# Patient Record
Sex: MALE | Race: WHITE | NOT HISPANIC OR LATINO | Employment: OTHER | URBAN - METROPOLITAN AREA
[De-identification: names, ages, dates, MRNs, and addresses within clinical notes are randomized per-mention and may not be internally consistent; named-entity substitution may affect disease eponyms.]

---

## 2017-05-05 ENCOUNTER — OFFICE VISIT (OUTPATIENT)
Dept: CARDIOLOGY | Facility: CLINIC | Age: 67
End: 2017-05-05

## 2017-05-05 VITALS
SYSTOLIC BLOOD PRESSURE: 128 MMHG | WEIGHT: 223.2 LBS | DIASTOLIC BLOOD PRESSURE: 78 MMHG | HEART RATE: 73 BPM | BODY MASS INDEX: 28.64 KG/M2 | HEIGHT: 74 IN

## 2017-05-05 DIAGNOSIS — K80.20 ASYMPTOMATIC CHOLELITHIASIS: ICD-10-CM

## 2017-05-05 DIAGNOSIS — E78.5 DYSLIPIDEMIA: ICD-10-CM

## 2017-05-05 DIAGNOSIS — E66.3 OVERWEIGHT (BMI 25.0-29.9): ICD-10-CM

## 2017-05-05 DIAGNOSIS — I50.42 CHRONIC COMBINED SYSTOLIC AND DIASTOLIC HEART FAILURE (HCC): ICD-10-CM

## 2017-05-05 DIAGNOSIS — R07.9 CHEST PAIN, UNSPECIFIED TYPE: Primary | ICD-10-CM

## 2017-05-05 DIAGNOSIS — I50.42 CHRONIC COMBINED SYSTOLIC AND DIASTOLIC CONGESTIVE HEART FAILURE (HCC): ICD-10-CM

## 2017-05-05 DIAGNOSIS — T73.3XXD FATIGUE DUE TO EXCESSIVE EXERTION, SUBSEQUENT ENCOUNTER: ICD-10-CM

## 2017-05-05 PROCEDURE — 99213 OFFICE O/P EST LOW 20 MIN: CPT | Performed by: INTERNAL MEDICINE

## 2017-05-05 RX ORDER — CARVEDILOL 12.5 MG/1
12.5 TABLET ORAL 2 TIMES DAILY WITH MEALS
COMMUNITY

## 2017-05-05 RX ORDER — MONTELUKAST SODIUM 10 MG/1
10 TABLET ORAL NIGHTLY
COMMUNITY

## 2017-05-05 RX ORDER — POTASSIUM CHLORIDE 20 MEQ/1
20 TABLET, EXTENDED RELEASE ORAL DAILY
COMMUNITY

## 2017-05-24 ENCOUNTER — HOSPITAL ENCOUNTER (OUTPATIENT)
Dept: CARDIOLOGY | Facility: HOSPITAL | Age: 67
Discharge: HOME | End: 2017-05-24
Attending: INTERNAL MEDICINE

## 2017-05-24 ENCOUNTER — HOSPITAL ENCOUNTER (OUTPATIENT)
Dept: CARDIOLOGY | Facility: HOSPITAL | Age: 67
Discharge: HOME | End: 2017-05-24
Attending: INTERNAL MEDICINE | Admitting: INTERNAL MEDICINE

## 2017-05-24 VITALS — HEIGHT: 74 IN | WEIGHT: 218 LBS | BODY MASS INDEX: 27.98 KG/M2

## 2017-05-24 DIAGNOSIS — R07.9 CHEST PAIN, UNSPECIFIED TYPE: ICD-10-CM

## 2017-05-24 DIAGNOSIS — I50.42 CHRONIC COMBINED SYSTOLIC AND DIASTOLIC HEART FAILURE (HCC): ICD-10-CM

## 2017-05-24 LAB
BH CV STRESS BP STAGE 1: NORMAL
BH CV STRESS BP STAGE 2: NORMAL
BH CV STRESS BP STAGE 3: NORMAL
BH CV STRESS DURATION MIN STAGE 1: 3
BH CV STRESS DURATION MIN STAGE 2: 3
BH CV STRESS DURATION MIN STAGE 3: 1
BH CV STRESS DURATION SEC STAGE 1: 0
BH CV STRESS DURATION SEC STAGE 2: 0
BH CV STRESS DURATION SEC STAGE 3: 41
BH CV STRESS GRADE STAGE 1: 10
BH CV STRESS GRADE STAGE 2: 12
BH CV STRESS GRADE STAGE 3: 14
BH CV STRESS HR STAGE 1: 103
BH CV STRESS HR STAGE 2: 121
BH CV STRESS HR STAGE 3: 134
BH CV STRESS METS STAGE 1: 5
BH CV STRESS METS STAGE 2: 7.5
BH CV STRESS METS STAGE 3: 10
BH CV STRESS O2 STAGE 1: 97
BH CV STRESS O2 STAGE 2: 97
BH CV STRESS O2 STAGE 3: 96
BH CV STRESS PROTOCOL 1: NORMAL
BH CV STRESS RECOVERY BP: NORMAL MMHG
BH CV STRESS RECOVERY HR: 87 BPM
BH CV STRESS SPEED STAGE 1: 1.7
BH CV STRESS SPEED STAGE 2: 2.5
BH CV STRESS SPEED STAGE 3: 3.4
BH CV STRESS STAGE 1: 1
BH CV STRESS STAGE 2: 2
BH CV STRESS STAGE 3: 3
MAXIMAL PREDICTED HEART RATE: 153 BPM
PERCENT MAX PREDICTED HR: 87.58 %
STRESS BASELINE BP: NORMAL MMHG
STRESS BASELINE HR: 70 BPM
STRESS PERCENT HR: 103 %
STRESS POST ESTIMATED WORKLOAD: 8.2 METS
STRESS POST EXERCISE DUR MIN: 7 MIN
STRESS POST EXERCISE DUR SEC: 41 SEC
STRESS POST PEAK BP: NORMAL MMHG
STRESS POST PEAK HR: 134 BPM
STRESS TARGET HR: 130 BPM

## 2017-05-24 PROCEDURE — A9500 TC99M SESTAMIBI: HCPCS | Performed by: INTERNAL MEDICINE

## 2017-05-24 PROCEDURE — 93018 CV STRESS TEST I&R ONLY: CPT | Performed by: INTERNAL MEDICINE

## 2017-05-24 PROCEDURE — 78452 HT MUSCLE IMAGE SPECT MULT: CPT | Performed by: INTERNAL MEDICINE

## 2017-05-24 PROCEDURE — 93017 CV STRESS TEST TRACING ONLY: CPT

## 2017-05-24 PROCEDURE — 78452 HT MUSCLE IMAGE SPECT MULT: CPT

## 2017-05-24 PROCEDURE — 0 TECHNETIUM SESTAMIBI: Performed by: INTERNAL MEDICINE

## 2017-05-24 RX ADMIN — Medication 1 DOSE: at 11:30

## 2017-05-24 RX ADMIN — Medication 1 DOSE: at 13:35

## 2017-11-15 ENCOUNTER — OFFICE VISIT (OUTPATIENT)
Dept: CARDIOLOGY | Facility: CLINIC | Age: 67
End: 2017-11-15

## 2017-11-15 VITALS
HEART RATE: 79 BPM | DIASTOLIC BLOOD PRESSURE: 77 MMHG | HEIGHT: 74 IN | BODY MASS INDEX: 28.42 KG/M2 | WEIGHT: 221.4 LBS | SYSTOLIC BLOOD PRESSURE: 117 MMHG

## 2017-11-15 DIAGNOSIS — K80.20 ASYMPTOMATIC CHOLELITHIASIS: ICD-10-CM

## 2017-11-15 DIAGNOSIS — I42.0 DILATED CARDIOMYOPATHY (HCC): ICD-10-CM

## 2017-11-15 DIAGNOSIS — E78.5 DYSLIPIDEMIA: ICD-10-CM

## 2017-11-15 DIAGNOSIS — I50.42 CHRONIC COMBINED SYSTOLIC AND DIASTOLIC CONGESTIVE HEART FAILURE (HCC): Primary | ICD-10-CM

## 2017-11-15 PROCEDURE — 99214 OFFICE O/P EST MOD 30 MIN: CPT | Performed by: INTERNAL MEDICINE

## 2017-11-15 PROCEDURE — 90686 IIV4 VACC NO PRSV 0.5 ML IM: CPT | Performed by: INTERNAL MEDICINE

## 2017-11-15 PROCEDURE — 90471 IMMUNIZATION ADMIN: CPT | Performed by: INTERNAL MEDICINE

## 2017-11-15 RX ORDER — BACLOFEN 10 MG/1
10 TABLET ORAL AS NEEDED
COMMUNITY
End: 2019-07-26

## 2017-11-15 RX ORDER — CETIRIZINE HYDROCHLORIDE 10 MG/1
10 TABLET ORAL DAILY
COMMUNITY

## 2017-11-15 NOTE — PROGRESS NOTES
Subjective:     Encounter Date:11/15/2017    Patient ID: Howard Luis is a 67 y.o.  white male, retired [employer] , from [place], [place].      PHYSICIAN: Jessica Starkey MD  OTOLARYNGOLOGIST: Dr. Dela Cruz, [place], [place]  PRIOR CARDIOLOGIST: Ele Patterson MD    Chief Complaint:   Chief Complaint   Patient presents with   • Chest Pain     Problem List:  1. Nonischemic dilated congestive cardiomyopathy:  a. Remote CCS class I angina pectoris/NYHA class II-III exertional dyspnea and fatigue syndrome with abnormal EKG/abnormal echocardiogram with diastolic LV dysfunction and bi-atrial enlargement, RV enlargement without valvulopathy with mild MR and trace TR with acceptable LVEF (0.50) with subsequently abnormal Cardiolite GXT demonstrating dilated left ventricle with diffuse hypokinesis and reduced LVEF (0.40) with moderate inferolateral ischemia with good exercise tolerance and capacity, autumn 2011.  b. Normal dominant right coronary arteries with reduced compensated systolic LV function (LVEF 0.42), February 2012.  c. Residual CCS class I angina pectoris/NYHA class I-II exertional dyspnea and fatigue syndrome with stable abnormal echocardiograms, November 2012; July 2013; September 2014; June 2015.   d. Remote CCS class II chest pain syndrome with NYHA class II-III exertional dyspnea and fatigue syndrome with acceptable echocardiogram and acceptable laboratory studies including normal D-dimer and BNP as well as low probability of pulmonary ventilation-perfusion scan, February 2016.  e. Remote CCS class I angina pectoris/NYHA class II exertional dyspnea and fatigue with preliminary acceptable echocardiogram and within normal limits EKG (November 2016).  f. Recent CCS class II-III chest pain syndrome with NYHA class II-III exertional dyspnea and fatigue with limited activity, May 2017, and subsequent acceptable negative Cardiolite GXT suggestive of low probability for CAD following  exercise to 100% predicted exercise capacity with inability to gait rhythm to perform LVEF assessment.  g. Residual class I symptoms.  2. Dyslipidemia.   3. Mildly overweight (BMI 28.4).   4. Erectile dysfunction.   5. Intermittent recurrent sinusitis, winter 2012.   6. Remote operations:  a. Upper and lower extensive dental implants, 1992 and 2004.   b. Left forearm basal cell carcinoma removal, February 2011.   c. Complex nasal/sinus surgery - data deficit, [place], January 2012.   7. Asymptomatic cholelithiasis.   8. Remote noncompliance.  9. Fatigue syndrome with a history of normal formal sleep evaluation 9 years ago, 2006.     No Known Allergies      Current Outpatient Prescriptions:   •  aspirin 81 MG tablet, Take  by mouth daily., Disp: , Rfl:   •  baclofen (LIORESAL) 10 MG tablet, Take 10 mg by mouth As Needed for Muscle Spasms., Disp: , Rfl:   •  carvedilol (COREG) 12.5 MG tablet, Take 12.5 mg by mouth 2 (Two) Times a Day With Meals., Disp: , Rfl:   •  cetirizine (zyrTEC) 10 MG tablet, Take 10 mg by mouth Daily., Disp: , Rfl:   •  Cholecalciferol (VITAMIN D3) 68676 UNITS capsule, Take 5,000 Units by mouth Daily., Disp: , Rfl:   •  FLUoxetine (PROzac) 40 MG capsule, Take  by mouth 2 (two) times a day., Disp: , Rfl:   •  fluticasone (FLONASE) 50 MCG/ACT nasal spray, 1 spray into each nostril as needed., Disp: , Rfl:   •  furosemide (LASIX) 40 MG tablet, Take 40 mg by mouth daily., Disp: , Rfl:   •  ibuprofen (ADVIL,MOTRIN) 600 MG tablet, Take  by mouth as needed., Disp: , Rfl:   •  meloxicam (MOBIC) 15 MG tablet, Take 15 mg by mouth As Needed., Disp: , Rfl:   •  montelukast (SINGULAIR) 10 MG tablet, Take 10 mg by mouth Every Night., Disp: , Rfl:   •  pantoprazole (PROTONIX) 40 MG EC tablet, Take  by mouth daily., Disp: , Rfl:   •  potassium chloride (K-DUR,KLOR-CON) 20 MEQ CR tablet, Take 20 mEq by mouth Daily., Disp: , Rfl:   •  ramipril (ALTACE) 10 MG capsule, Take  by mouth daily., Disp: , Rfl:   •   "sildenafil (VIAGRA) 100 MG tablet, Take  by mouth as needed., Disp: , Rfl:   •  Testosterone Cypionate (DEPO-TESTOSTERONE) 200 MG/ML injection, Apply  to cheek every 14 (fourteen) days., Disp: , Rfl:   •  Unable to find, Every Night. Med Name: Uses oxygen at night., Disp: , Rfl:   •  zolpidem (AMBIEN) 10 MG tablet, Take  by mouth as needed., Disp: , Rfl:     HISTORY OF PRESENT ILLNESS: Patient returns for scheduled followup after a 6-month hiatus. He says that he felt \"pretty bad\" for most of the summer and all he wanted to do was sit, but now that the weather is changing, he has done better and has more energy.  He and his wife travel when they can, but the last trip they took was the [place] cruise in [place].  Heart-wise, he is doing well now, and he has been walking and exercising more.  He says that his \"honey-do list has gotten long.\"  He has no symptoms from a cardiopulmonary perspective with his daily activities.  He has not gotten his flu shot yet, but \"it's on the list.\"  They are staying home for Thanksgiving; he notes that he and his wife have both had a bad cough.  He has had no fever with the cough.  Patient otherwise denies chest pain, shortness of breath, PND, edema, palpitations, syncope or presyncope at this time.        ROS   Obtained and otherwise negative except as outlined in problem list and HPI.    Procedures       Objective:       Vitals:    11/15/17 1420 11/15/17 1421   BP: 116/83 117/77   BP Location: Left arm Left arm   Patient Position: Sitting Standing   Pulse: 70 79   Weight: 221 lb 6.4 oz (100 kg)    Height: 74\" (188 cm)      Body mass index is 28.43 kg/(m^2).   Last weight:  223 lbs.    Physical Exam   Constitutional: He is oriented to person, place, and time. He appears well-developed and well-nourished.   Neck: No JVD present. Carotid bruit is not present. No thyromegaly present.   Cardiovascular: Regular rhythm, S1 normal, S2 normal and normal heart sounds.  Exam reveals no " gallop, no S3 and no friction rub.    No murmur heard.  Pulses:       Dorsalis pedis pulses are 2+ on the right side, and 2+ on the left side.        Posterior tibial pulses are 2+ on the right side, and 2+ on the left side.   Pulmonary/Chest: Effort normal and breath sounds normal. He has no wheezes. He has no rhonchi. He has no rales.   Abdominal: Soft. He exhibits no mass. There is no hepatosplenomegaly. There is no tenderness. There is no guarding.   Bowel sounds audible x4   Musculoskeletal: Normal range of motion. He exhibits no edema.   Lymphadenopathy:     He has no cervical adenopathy.   Neurological: He is alert and oriented to person, place, and time.   Skin: Skin is warm, dry and intact. No rash noted.   Vitals reviewed.        Lab Review:   Lab Results   Component Value Date    GLUCOSE 89 01/29/2016    BUN 17 01/29/2016    CREATININE 1.25 01/29/2016    BCR 13.5 01/29/2016    CO2 31.7 01/29/2016    CALCIUM 9.8 01/29/2016       Lab Results   Component Value Date    WBC 5.9 01/29/2016    HGB 18.0 01/29/2016    HCT 53.1 (H) 01/29/2016    MCV 90.8 01/29/2016     01/29/2016       Lab Results   Component Value Date    BNP 25 01/29/2016           Assessment:   Overall continued acceptable course with no interim cardiopulmonary complaints with good functional status. We will defer additional diagnostic or therapeutic intervention from a cardiac perspective at this time.  The patient requests that he have a flu shot today.       Diagnosis Plan   1. Chronic combined systolic and diastolic congestive heart failure  No recurrent angina pectoris or CHF.     2. Asymptomatic cholelithiasis  Asymptomatic   3. Dyslipidemia  No recent data to review   4. Dilated cardiomyopathy  Acceptable control of symptoms at this time          Plan:         1. Patient to continue current medications and close follow up with the above providers.  2. Influenza immunization is administered today without difficulty or complication  in the left deltoid.  3. Tentative cardiology follow up in May 2018, or patient may return sooner PRN with consideration of echocardiogram 6-12 months thereafter.       Transcribed by Karmen Rosales for Dr. Walt Paula at 2:29 PM on 11/15/2017      IWalt MD, [facility], personally performed the services described in this documentation as scribed by the above named individual in my presence, and it is both accurate and complete. At 3:31 PM on 11/15/2017

## 2018-05-25 ENCOUNTER — OFFICE VISIT (OUTPATIENT)
Dept: CARDIOLOGY | Facility: CLINIC | Age: 68
End: 2018-05-25

## 2018-05-25 VITALS
WEIGHT: 222 LBS | HEIGHT: 74 IN | HEART RATE: 74 BPM | BODY MASS INDEX: 28.49 KG/M2 | SYSTOLIC BLOOD PRESSURE: 86 MMHG | DIASTOLIC BLOOD PRESSURE: 60 MMHG

## 2018-05-25 DIAGNOSIS — I42.0 DILATED CARDIOMYOPATHY (HCC): Primary | ICD-10-CM

## 2018-05-25 DIAGNOSIS — Z12.5 ENCOUNTER FOR SCREENING FOR MALIGNANT NEOPLASM OF PROSTATE: ICD-10-CM

## 2018-05-25 DIAGNOSIS — N52.9 ERECTILE DYSFUNCTION, UNSPECIFIED ERECTILE DYSFUNCTION TYPE: ICD-10-CM

## 2018-05-25 DIAGNOSIS — E78.5 DYSLIPIDEMIA: ICD-10-CM

## 2018-05-25 DIAGNOSIS — I50.42 CHRONIC COMBINED SYSTOLIC AND DIASTOLIC CONGESTIVE HEART FAILURE (HCC): ICD-10-CM

## 2018-05-25 PROCEDURE — 99214 OFFICE O/P EST MOD 30 MIN: CPT | Performed by: INTERNAL MEDICINE

## 2018-05-25 NOTE — PROGRESS NOTES
Subjective:     Encounter Date:05/25/2018    Patient ID: Howard Luis is a 68 y.o.  white male, retired [employer] , from [place], [place].      PHYSICIAN: Jessica Starkey MD  OTOLARYNGOLOGIST: Dr. Dela Cruz, [place], [place]  PRIOR CARDIOLOGIST: Ele Patterson MD    Chief Complaint:   Chief Complaint   Patient presents with   • Congestive Heart Failure   • Shortness of Breath     Problem List:  1. Nonischemic dilated congestive cardiomyopathy:  a. Remote CCS class I angina pectoris/NYHA class II-III exertional dyspnea and fatigue syndrome with abnormal EKG/abnormal echocardiogram with diastolic LV dysfunction and bi-atrial enlargement, RV enlargement without valvulopathy with mild MR and trace TR with acceptable LVEF (0.50) with subsequently abnormal Cardiolite GXT demonstrating dilated left ventricle with diffuse hypokinesis and reduced LVEF (0.40) with moderate inferolateral ischemia with good exercise tolerance and capacity, autumn 2011.  b. Normal dominant right coronary arteries with reduced compensated systolic LV function (LVEF 0.42), February 2012.  c. Residual CCS class I angina pectoris/NYHA class I-II exertional dyspnea and fatigue syndrome with stable abnormal echocardiograms, November 2012; July 2013; September 2014; June 2015.   d. Remote CCS class II chest pain syndrome with NYHA class II-III exertional dyspnea and fatigue syndrome with acceptable echocardiogram and acceptable laboratory studies including normal D-dimer and BNP as well as low probability of pulmonary ventilation-perfusion scan, February 2016.  e. Remote CCS class I angina pectoris/NYHA class II exertional dyspnea and fatigue with preliminary acceptable echocardiogram and within normal limits EKG (November 2016).  f. Recent CCS class II-III chest pain syndrome with NYHA class II-III exertional dyspnea and fatigue with limited activity, May 2017, and subsequent acceptable negative Cardiolite GXT suggestive  of low probability for CAD following exercise to 100% predicted exercise capacity with inability to gait rhythm to perform LVEF assessment.  g. Residual class I symptoms.  2. Dyslipidemia.   3. Mildly overweight (BMI 28.5).   4. Erectile dysfunction.   5. Intermittent recurrent sinusitis, winter 2012.   6. Remote operations:  a. Upper and lower extensive dental implants, 1992 and 2004.   b. Left forearm basal cell carcinoma removal, February 2011.   c. Complex nasal/sinus surgery - data deficit, [place], January 2012.   7. Asymptomatic cholelithiasis.   8. Remote noncompliance.  9. Fatigue syndrome with a history of normal formal sleep evaluation 9 years ago, 2006.     No Known Allergies      Current Outpatient Prescriptions:   •  aspirin 81 MG tablet, Take  by mouth daily., Disp: , Rfl:   •  baclofen (LIORESAL) 10 MG tablet, Take 10 mg by mouth As Needed for Muscle Spasms., Disp: , Rfl:   •  carvedilol (COREG) 12.5 MG tablet, Take 12.5 mg by mouth 2 (Two) Times a Day With Meals., Disp: , Rfl:   •  cetirizine (zyrTEC) 10 MG tablet, Take 10 mg by mouth Daily., Disp: , Rfl:   •  Cholecalciferol (VITAMIN D3) 25549 UNITS capsule, Take 5,000 Units by mouth Daily., Disp: , Rfl:   •  FLUoxetine (PROzac) 40 MG capsule, Take  by mouth 2 (two) times a day., Disp: , Rfl:   •  fluticasone (FLONASE) 50 MCG/ACT nasal spray, 1 spray into each nostril as needed., Disp: , Rfl:   •  furosemide (LASIX) 40 MG tablet, Take 40 mg by mouth daily., Disp: , Rfl:   •  ibuprofen (ADVIL,MOTRIN) 600 MG tablet, Take  by mouth as needed., Disp: , Rfl:   •  meloxicam (MOBIC) 15 MG tablet, Take 15 mg by mouth As Needed., Disp: , Rfl:   •  montelukast (SINGULAIR) 10 MG tablet, Take 10 mg by mouth Every Night., Disp: , Rfl:   •  potassium chloride (K-DUR,KLOR-CON) 20 MEQ CR tablet, Take 20 mEq by mouth Daily., Disp: , Rfl:   •  ramipril (ALTACE) 10 MG capsule, Take  by mouth daily., Disp: , Rfl:   •  sildenafil (VIAGRA) 100 MG tablet, Take  by  "mouth as needed., Disp: , Rfl:   •  Testosterone Cypionate (DEPO-TESTOSTERONE) 200 MG/ML injection, Apply  to cheek every 14 (fourteen) days., Disp: , Rfl:   •  Unable to find, Every Night. Med Name: Uses oxygen at night., Disp: , Rfl:   •  zolpidem (AMBIEN) 10 MG tablet, Take  by mouth as needed., Disp: , Rfl:     HISTORY OF PRESENT ILLNESS: Patient returns for scheduled 6-month followup. He denies any chest pressure but has intermittent shortness of breath on exertion.  He attributes this to his cardiomyopathy.  He had a stress test in May 2017 which was acceptable.  Occasionally, he has lower extremity edema as well.  He denies any fevers, sputum production, chest pressure, palpitations, or presyncope.  He is fatigued.  He checks his blood pressure at home, and it is normally around 120/70.  He has not had any recent laboratory testing. He does not feel that his SOB is worse than 6 months ago.  Patient otherwise denies chest pain, shortness of breath, PND, edema, palpitations, syncope or presyncope at this time on limited activity.  He drove across country to [facility] to accompany a friend who was on a medical visit to [facility] [place] for gastric sleeve surgery.  He will journey to [place] for a river cruise in early July 2018.       Review of Systems   Constitution: Positive for weakness.   HENT: Positive for tinnitus.    Respiratory: Positive for shortness of breath.       Obtained and otherwise negative except as outlined in problem list and HPI.    Procedures       Objective:       Vitals:    05/25/18 1345 05/25/18 1347   BP: 108/71 (!) 86/60   BP Location: Left arm Left arm   Patient Position: Sitting Standing   Pulse: 65 74   Weight: 101 kg (222 lb) 101 kg (222 lb)   Height: 188 cm (74\") 188 cm (74\")   Recheck blood pressure right arm sitting is 112/76, 120/80 right arm standing  Body mass index is 28.5 kg/m².   Last weight:  221 lbs.    Physical Exam   Constitutional: He is oriented to person, place, " and time. He appears well-developed and well-nourished.   Neck: No JVD present. Carotid bruit is not present. No thyromegaly present.   Cardiovascular: Regular rhythm, S1 normal, S2 normal and normal heart sounds.  Exam reveals no gallop, no S3 and no friction rub.    No murmur heard.  Pulses:       Dorsalis pedis pulses are 1+ on the right side, and 1+ on the left side.        Posterior tibial pulses are 1+ on the right side, and 1+ on the left side.   Pulmonary/Chest: Effort normal. He has decreased breath sounds. He has no wheezes. He has no rhonchi. He has no rales.   Abdominal: Soft. He exhibits no mass. There is no hepatosplenomegaly. There is no tenderness. There is no guarding.   Bowel sounds audible x4   Musculoskeletal: Normal range of motion. He exhibits no edema.   Lymphadenopathy:     He has no cervical adenopathy.   Neurological: He is alert and oriented to person, place, and time.   Skin: Skin is warm, dry and intact. No rash noted.   Vitals reviewed.        Lab Review:   Lab Results   Component Value Date    GLUCOSE 89 01/29/2016    BUN 17 01/29/2016    CREATININE 1.25 01/29/2016    BCR 13.5 01/29/2016    CO2 31.7 01/29/2016    CALCIUM 9.8 01/29/2016       Lab Results   Component Value Date    WBC 5.9 01/29/2016    HGB 18.0 01/29/2016    HCT 53.1 (H) 01/29/2016    MCV 90.8 01/29/2016     01/29/2016       Lab Results   Component Value Date    BNP 25 01/29/2016           Assessment:   Overall continued acceptable course with no interim cardiopulmonary complaints with acceptable functional status. We will provide him with laboratory slips to have the following tests: CMP, CBC, FLP, PSA.       Diagnosis Plan   1. Dilated cardiomyopathy  CBC & Differential    Comprehensive Metabolic Panel    PSA Screen   2. Chronic combined systolic and diastolic congestive heart failure  CBC & Differential    Comprehensive Metabolic Panel    PSA Screen   3. Dyslipidemia  CBC & Differential    Lipid Panel   4.  Erectile dysfunction, unspecified erectile dysfunction type  PSA Screen   5. Encounter for screening for malignant neoplasm of prostate   PSA Screen          Plan:         1. Patient to continue current medications and close follow up with the above providers.  2. Tentative cardiology follow up in November 2018 or patient may return sooner PRN.  3. CBC, CMP, FLP, PSA    Scribed for Walt Paula MD by Griselda Ramirez, APRN. 5/25/2018  2:31 PM    I, Walt Paula MD, FACC, personally performed the services described in this documentation as scribed by the above named individual in my presence, and it is both accurate and complete. At 3:58 PM on 05/25/2018

## 2018-08-20 ENCOUNTER — HOSPITAL ENCOUNTER (OUTPATIENT)
Dept: RESPIRATORY THERAPY | Facility: HOSPITAL | Age: 68
Discharge: HOME | End: 2018-08-20
Attending: INTERNAL MEDICINE | Admitting: INTERNAL MEDICINE

## 2018-08-20 ENCOUNTER — TRANSCRIBE ORDERS (OUTPATIENT)
Dept: ADMINISTRATIVE | Facility: HOSPITAL | Age: 68
End: 2018-08-20

## 2018-08-20 DIAGNOSIS — R00.2 PALPITATIONS: ICD-10-CM

## 2018-08-20 DIAGNOSIS — R00.2 PALPITATIONS: Primary | ICD-10-CM

## 2018-08-20 PROCEDURE — 93225 XTRNL ECG REC<48 HRS REC: CPT

## 2018-08-20 PROCEDURE — 93226 XTRNL ECG REC<48 HR SCAN A/R: CPT

## 2018-08-24 ENCOUNTER — TELEPHONE (OUTPATIENT)
Dept: CARDIOLOGY | Facility: CLINIC | Age: 68
End: 2018-08-24

## 2018-08-24 NOTE — TELEPHONE ENCOUNTER
Patient called and stated that his family MD was faxing records from a [facility].  He stated that they wanted him to see a cardiologist but he told them he was seeing [facility].    Attached records.

## 2018-08-31 ENCOUNTER — TELEPHONE (OUTPATIENT)
Dept: CARDIOLOGY | Facility: CLINIC | Age: 68
End: 2018-08-31

## 2019-01-16 ENCOUNTER — OFFICE VISIT (OUTPATIENT)
Dept: CARDIOLOGY | Facility: CLINIC | Age: 69
End: 2019-01-16

## 2019-01-16 VITALS
HEART RATE: 69 BPM | DIASTOLIC BLOOD PRESSURE: 72 MMHG | BODY MASS INDEX: 28.62 KG/M2 | WEIGHT: 223 LBS | SYSTOLIC BLOOD PRESSURE: 103 MMHG | HEIGHT: 74 IN

## 2019-01-16 DIAGNOSIS — E78.5 DYSLIPIDEMIA: ICD-10-CM

## 2019-01-16 DIAGNOSIS — I50.42 CHRONIC COMBINED SYSTOLIC AND DIASTOLIC CONGESTIVE HEART FAILURE (HCC): Primary | ICD-10-CM

## 2019-01-16 DIAGNOSIS — I42.0 DILATED CARDIOMYOPATHY (HCC): ICD-10-CM

## 2019-01-16 PROCEDURE — 99214 OFFICE O/P EST MOD 30 MIN: CPT | Performed by: INTERNAL MEDICINE

## 2019-01-16 NOTE — PROGRESS NOTES
Subjective:     Encounter Date:01/16/2019    Patient ID: Howard Luis is a 68 y.o.  white male, retired [employer] , from [place], [place].      PHYSICIAN: Jessica Starkey MD  OTOLARYNGOLOGIST: Dr. Dela Cruz, [place], [place]  PRIOR CARDIOLOGIST: Ele Patterson MD    Chief Complaint:   Chief Complaint   Patient presents with   • Cardiomyopathy   • Congestive Heart Failure     Problem List:  1. Nonischemic dilated congestive cardiomyopathy:  a. Remote CCS class I angina pectoris/NYHA class II-III exertional dyspnea and fatigue syndrome with abnormal EKG/abnormal echocardiogram with diastolic LV dysfunction and bi-atrial enlargement, RV enlargement without valvulopathy with mild MR and trace TR with acceptable LVEF (0.50) with subsequently abnormal Cardiolite GXT demonstrating dilated left ventricle with diffuse hypokinesis and reduced LVEF (0.40) with moderate inferolateral ischemia with good exercise tolerance and capacity, autumn 2011.  b. Normal dominant right coronary arteries with reduced compensated systolic LV function (LVEF 0.42), February 2012.  c. Residual CCS class I angina pectoris/NYHA class I-II exertional dyspnea and fatigue syndrome with stable abnormal echocardiograms, November 2012; July 2013; September 2014; June 2015.   d. Remote CCS class II chest pain syndrome with NYHA class II-III exertional dyspnea and fatigue syndrome with acceptable echocardiogram and acceptable laboratory studies including normal D-dimer and BNP as well as low probability of pulmonary ventilation-perfusion scan, February 2016.  e. Remote CCS class I angina pectoris/NYHA class II exertional dyspnea and fatigue with preliminary acceptable echocardiogram and within normal limits EKG (November 2016).  f. Remote CCS class II-III chest pain syndrome with NYHA class II-III exertional dyspnea and fatigue with limited activity, May 2017, and subsequent acceptable negative Cardiolite GXT suggestive of low  probability for CAD following exercise to 100% predicted exercise capacity with inability to gait rhythm to perform LVEF assessment.  g. Residual class I symptoms.  2. Dyslipidemia.   3. Mildly overweight (BMI 28.6).   4. Erectile dysfunction.   5. Intermittent recurrent sinusitis, winter 2012.   6. Remote operations:  a. Upper and lower extensive dental implants, 1992 and 2004.   b. Left forearm basal cell carcinoma removal, February 2011.   c. Complex nasal/sinus surgery - data deficit, [place], January 2012.   7. Asymptomatic cholelithiasis.   8. Remote noncompliance.  9. Fatigue syndrome with a history of normal formal sleep evaluation 9 years ago, 2006.  10. PVCs with 24-hour Holter monitor demonstrating 2.2% VE's and no evidence of atrial fibrillation or heart block      No Known Allergies      Current Outpatient Medications:   •  aspirin 81 MG tablet, Take  by mouth daily., Disp: , Rfl:   •  baclofen (LIORESAL) 10 MG tablet, Take 10 mg by mouth As Needed for Muscle Spasms., Disp: , Rfl:   •  carvedilol (COREG) 12.5 MG tablet, Take 12.5 mg by mouth 2 (Two) Times a Day With Meals., Disp: , Rfl:   •  cetirizine (zyrTEC) 10 MG tablet, Take 10 mg by mouth Daily., Disp: , Rfl:   •  Cholecalciferol (VITAMIN D3) 59323 UNITS capsule, Take 5,000 Units by mouth As Needed., Disp: , Rfl:   •  FLUoxetine (PROzac) 40 MG capsule, Take  by mouth 2 (two) times a day., Disp: , Rfl:   •  fluticasone (FLONASE) 50 MCG/ACT nasal spray, 1 spray into each nostril as needed., Disp: , Rfl:   •  furosemide (LASIX) 40 MG tablet, Take 40 mg by mouth daily., Disp: , Rfl:   •  ibuprofen (ADVIL,MOTRIN) 600 MG tablet, Take  by mouth as needed., Disp: , Rfl:   •  meloxicam (MOBIC) 15 MG tablet, Take 15 mg by mouth As Needed., Disp: , Rfl:   •  montelukast (SINGULAIR) 10 MG tablet, Take 10 mg by mouth Every Night., Disp: , Rfl:   •  potassium chloride (K-DUR,KLOR-CON) 20 MEQ CR tablet, Take 20 mEq by mouth Daily., Disp: , Rfl:   •  ramipril  "(ALTACE) 10 MG capsule, Take  by mouth daily., Disp: , Rfl:   •  sildenafil (VIAGRA) 100 MG tablet, Take  by mouth as needed., Disp: , Rfl:   •  Testosterone Cypionate (DEPO-TESTOSTERONE) 200 MG/ML injection, Apply  to cheek every 14 (fourteen) days., Disp: , Rfl:   •  Unable to find, Every Night. Med Name: Uses oxygen at night., Disp: , Rfl:   •  zolpidem (AMBIEN) 10 MG tablet, Take  by mouth as needed., Disp: , Rfl:     HISTORY OF PRESENT ILLNESS: Patient returns for followup after an 8-month hiatus.  He occasionally has mild chest discomfort and SOB, but he has never felt the need to take nitroglycerin.  This happens very sporadically and infrequently.  He had an episode a few months ago when he was having PVCs and had the skipped beats for 1.5 weeks, and then they spontaneously resolved.  He wore a 24-hour Holter monitor which demonstrated PVCs.  His main concern today is his lack of sex drive.  He describes it as \"going to a buffet but you aren't hungry.\" He is wondering if some of his medications are causing his symptoms.  He was informed that this may be due to his Prozac. He is taking testosterone every other week, but he states that his physician always worries about PSA levels.  He has not had any recent laboratory testing but will send us the next results when he has his blood drawn.  He denies any dizziness, presyncope, syncope, or edema.  Patient otherwise denies chest pain, shortness of breath, PND, edema, recurrent palpitations, syncope or presyncope at this time.        Review of Systems   HENT: Positive for hearing loss.    Respiratory: Positive for shortness of breath.    Endocrine: Positive for cold intolerance.      Obtained and otherwise negative except as outlined in problem list and HPI.    Procedures       Objective:       Vitals:    01/16/19 1410 01/16/19 1411   BP: 133/79 103/72   BP Location: Left arm Left arm   Patient Position: Sitting Standing   Pulse: 64 69   Weight: 101 kg (223 lb)  " "  Height: 188 cm (74\")      Body mass index is 28.63 kg/m².   Last weight:  222 lbs.    Physical Exam   Constitutional: He is oriented to person, place, and time. He appears well-developed and well-nourished.   Neck: No JVD present. Carotid bruit is not present. No thyromegaly present.   Cardiovascular: Regular rhythm, S1 normal, S2 normal and normal heart sounds. Exam reveals no gallop, no S3 and no friction rub.   No murmur heard.  Pulses:       Dorsalis pedis pulses are 2+ on the right side, and 2+ on the left side.        Posterior tibial pulses are 2+ on the right side, and 2+ on the left side.   Pulmonary/Chest: Effort normal and breath sounds normal. He has no wheezes. He has no rhonchi. He has no rales.   Abdominal: Soft. He exhibits no mass. There is no hepatosplenomegaly. There is no tenderness. There is no guarding.   Bowel sounds audible x4   Musculoskeletal: Normal range of motion. He exhibits no edema.   Lymphadenopathy:     He has no cervical adenopathy.   Neurological: He is alert and oriented to person, place, and time.   Skin: Skin is warm, dry and intact. No rash noted.   Vitals reviewed.        Lab Review:   08/31/2018: (reviewed with patient by letter)  · Potassium - 4.2  · Magnesium - 2.1      Lab Results   Component Value Date    GLUCOSE 89 01/29/2016    BUN 17 01/29/2016    CREATININE 1.25 01/29/2016    BCR 13.5 01/29/2016    CO2 31.7 01/29/2016    CALCIUM 9.8 01/29/2016       Lab Results   Component Value Date    WBC 5.9 01/29/2016    HGB 18.0 01/29/2016    HCT 53.1 (H) 01/29/2016    MCV 90.8 01/29/2016     01/29/2016       Lab Results   Component Value Date    BNP 25 01/29/2016           Assessment:   Overall continued acceptable course with no interim cardiopulmonary complaints with acceptable functional status. We will defer additional diagnostic or therapeutic intervention from a cardiac perspective at this time.        Diagnosis Plan   1. Chronic combined systolic and diastolic " congestive heart failure (CMS/HCC)  Stable; No recurrent angina pectoris or CHF on current activity schedule; continue current treatment     2. Dilated cardiomyopathy (CMS/HCC)  Stable   3. Dyslipidemia  No new labs to review          Plan:         1. Patient to continue current medications and close follow up with the above providers.  2. Tentative cardiology follow up in July 2019, or patient may return sooner PRN.       Scribed for Walt Paula MD by Griselda Ramirez, APRN. 1/16/2019  2:39 PM    I, Walt Paula MD, [facility], personally performed the services described in this documentation as scribed by the above named individual in my presence, and it is both accurate and complete. At 2:50 PM on 01/16/2019

## 2019-07-26 ENCOUNTER — OFFICE VISIT (OUTPATIENT)
Dept: CARDIOLOGY | Facility: CLINIC | Age: 69
End: 2019-07-26

## 2019-07-26 VITALS
SYSTOLIC BLOOD PRESSURE: 107 MMHG | WEIGHT: 222.6 LBS | BODY MASS INDEX: 28.57 KG/M2 | HEIGHT: 74 IN | DIASTOLIC BLOOD PRESSURE: 66 MMHG | HEART RATE: 65 BPM

## 2019-07-26 DIAGNOSIS — E78.5 DYSLIPIDEMIA: ICD-10-CM

## 2019-07-26 DIAGNOSIS — I50.42 CHRONIC COMBINED SYSTOLIC AND DIASTOLIC CONGESTIVE HEART FAILURE (HCC): ICD-10-CM

## 2019-07-26 DIAGNOSIS — I42.0 DILATED CARDIOMYOPATHY (HCC): Primary | ICD-10-CM

## 2019-07-26 PROCEDURE — 99214 OFFICE O/P EST MOD 30 MIN: CPT | Performed by: INTERNAL MEDICINE

## 2019-07-26 RX ORDER — NITROGLYCERIN 0.4 MG/1
0.4 TABLET SUBLINGUAL AS NEEDED
COMMUNITY

## 2019-07-26 RX ORDER — ERGOCALCIFEROL 1.25 MG/1
CAPSULE ORAL WEEKLY
COMMUNITY
Start: 2019-07-15 | End: 2020-02-07

## 2019-07-26 NOTE — PROGRESS NOTES
Subjective:     Encounter Date:07/26/2019    Patient ID: Howard Luis is a 69 y.o.  white male, retired [employer] , from [place], [place].      PHYSICIAN: Jessica Starkey MD  OTOLARYNGOLOGIST: Dr. Dela Cruz, [place], [place]  PRIOR CARDIOLOGIST: Ele Patterson MD    Chief Complaint:   Chief Complaint   Patient presents with   • Cardiomyopathy   • Congestive Heart Failure     Problem List:  1. Nonischemic dilated congestive cardiomyopathy:  a. Remote CCS class I angina pectoris/NYHA class II-III exertional dyspnea and fatigue syndrome with abnormal EKG/abnormal echocardiogram with diastolic LV dysfunction and bi-atrial enlargement, RV enlargement without valvulopathy with mild MR and trace TR with acceptable LVEF (0.50) with subsequently abnormal Cardiolite GXT demonstrating dilated left ventricle with diffuse hypokinesis and reduced LVEF (0.40) with moderate inferolateral ischemia with good exercise tolerance and capacity, autumn 2011.  b. Normal dominant right coronary arteries with reduced compensated systolic LV function (LVEF 0.42), February 2012.  c. Residual CCS class I angina pectoris/NYHA class I-II exertional dyspnea and fatigue syndrome with stable abnormal echocardiograms, November 2012; July 2013; September 2014; June 2015.   d. Remote CCS class II chest pain syndrome with NYHA class II-III exertional dyspnea and fatigue syndrome with acceptable echocardiogram and acceptable laboratory studies including normal D-dimer and BNP as well as low probability of pulmonary ventilation-perfusion scan, February 2016.  e. Remote CCS class I angina pectoris/NYHA class II exertional dyspnea and fatigue with preliminary acceptable echocardiogram and within normal limits EKG (November 2016).  f. Remote CCS class II-III chest pain syndrome with NYHA class II-III exertional dyspnea and fatigue with limited activity, May 2017, and subsequent acceptable negative Cardiolite GXT suggestive of low  probability for CAD following exercise to 100% predicted exercise capacity with inability to gait rhythm to perform LVEF assessment.  g. Residual class I symptoms.  2. Dyslipidemia.   3. Mildly overweight (BMI 28.6).   4. Erectile dysfunction.   5. Intermittent recurrent sinusitis, winter 2012.   6. Remote operations:  a. Upper and lower extensive dental implants, 1992 and 2004.   b. Left forearm basal cell carcinoma removal, February 2011.   c. Complex nasal/sinus surgery - data deficit, [place], January 2012.   7. Asymptomatic cholelithiasis.   8. Remote noncompliance.  9. Fatigue syndrome with a history of normal formal sleep evaluation 9 years ago, 2006.  10. PVCs with 24-hour Holter monitor demonstrating 2.2% VE's and no evidence of atrial fibrillation or heart block August 2018       No Known Allergies      Current Outpatient Medications:   •  aspirin 81 MG tablet, Take  by mouth daily., Disp: , Rfl:   •  carvedilol (COREG) 12.5 MG tablet, Take 12.5 mg by mouth 2 (Two) Times a Day With Meals., Disp: , Rfl:   •  cetirizine (zyrTEC) 10 MG tablet, Take 10 mg by mouth Daily., Disp: , Rfl:   •  Cholecalciferol (VITAMIN D3) 16587 UNITS capsule, Take 5,000 Units by mouth As Needed., Disp: , Rfl:   •  FLUoxetine (PROzac) 40 MG capsule, Take  by mouth 2 (two) times a day., Disp: , Rfl:   •  fluticasone (FLONASE) 50 MCG/ACT nasal spray, 1 spray into each nostril as needed., Disp: , Rfl:   •  furosemide (LASIX) 40 MG tablet, Take 40 mg by mouth daily., Disp: , Rfl:   •  ibuprofen (ADVIL,MOTRIN) 600 MG tablet, Take  by mouth as needed., Disp: , Rfl:   •  meloxicam (MOBIC) 15 MG tablet, Take 15 mg by mouth As Needed., Disp: , Rfl:   •  montelukast (SINGULAIR) 10 MG tablet, Take 10 mg by mouth Every Night., Disp: , Rfl:   •  nitroglycerin (NITROSTAT) 0.4 MG SL tablet, Place 0.4 mg under the tongue As Needed for Chest Pain. Take no more than 3 doses in 15 minutes., Disp: , Rfl:   •  potassium chloride (K-DUR,KLOR-CON) 20  "MEQ CR tablet, Take 20 mEq by mouth Daily., Disp: , Rfl:   •  ramipril (ALTACE) 10 MG capsule, Take  by mouth daily., Disp: , Rfl:   •  Testosterone Cypionate (DEPO-TESTOSTERONE) 200 MG/ML injection, Apply  to cheek Every 28 (Twenty-Eight) Days., Disp: , Rfl:   •  Unable to find, Every Night. Med Name: Uses oxygen at night., Disp: , Rfl:   •  vitamin D (ERGOCALCIFEROL) 15498 units capsule capsule, 1 (One) Time Per Week., Disp: , Rfl:   •  zolpidem (AMBIEN) 10 MG tablet, Take  by mouth as needed., Disp: , Rfl:     HISTORY OF PRESENT ILLNESS: Patient returns for scheduled 6-month followup. The patient denies any chest pain, increased shortness of breath, dizziness, palpitations, presyncope, syncope, or edema.  He states that he sometimes has fatigue with his activities, but if he rests for just a couple of minutes, he can get up and continue his normal activities without any cardiopulmonary complaints.  He has not had any recent laboratory testing other than testosterone and PSA levels.  His testosterone was recently changed from every couple of weeks to once a month.  He has been compliant with his ramipril and Coreg at home.  He states that his blood pressure is always \"good.\"  He has had interim laboratory studies with Dr. Starkey otherwise but is unaware of the results, as are we.  He and his wife plan to hopefully cruise [place] in autumn 2019; he already has a travel packet.  Patient otherwise denies chest pain, shortness of breath, PND, edema, palpitations, syncope or presyncope at this time.      Review of Systems   Constitution: Positive for weakness and malaise/fatigue.   HENT: Positive for tinnitus.    Respiratory: Positive for shortness of breath.       All other systems reviewed and otherwise negative.    Procedures       Objective:       Vitals:    07/26/19 1133 07/26/19 1134   BP: 120/75 107/66   BP Location: Left arm Left arm   Patient Position: Sitting Standing   Pulse: 57 65   Weight: 101 kg " "(222 lb 9.6 oz)    Height: 188 cm (74\")      Body mass index is 28.58 kg/m².   Last weight:  223 lbs.    Physical Exam   Constitutional: He is oriented to person, place, and time. He appears well-developed and well-nourished.   Neck: No JVD present. Carotid bruit is not present. No thyromegaly present.   Cardiovascular: Regular rhythm, S1 normal and S2 normal. Exam reveals no gallop, no S3 and no friction rub.   Murmur heard.   Medium-pitched early systolic murmur is present with a grade of 2/6 at the lower left sternal border.  Pulses:       Dorsalis pedis pulses are 2+ on the right side, and 2+ on the left side.        Posterior tibial pulses are 2+ on the right side, and 2+ on the left side.   Pulmonary/Chest: Effort normal and breath sounds normal. He has no wheezes. He has no rhonchi. He has no rales.   Abdominal: Soft. He exhibits no mass. There is no hepatosplenomegaly. There is no tenderness. There is no guarding.   Bowel sounds audible x4   Musculoskeletal: Normal range of motion. He exhibits no edema.   Lymphadenopathy:     He has no cervical adenopathy.   Neurological: He is alert and oriented to person, place, and time.   Skin: Skin is warm, dry and intact. No rash noted.   Vitals reviewed.        Lab Review: No recent laboratory results available for review today    Lab Results   Component Value Date    GLUCOSE 89 01/29/2016    BUN 17 01/29/2016    CREATININE 1.25 01/29/2016    BCR 13.5 01/29/2016    CO2 31.7 01/29/2016    CALCIUM 9.8 01/29/2016       Lab Results   Component Value Date    WBC 5.9 01/29/2016    HGB 18.0 01/29/2016    HCT 53.1 (H) 01/29/2016    MCV 90.8 01/29/2016     01/29/2016         Lab Results   Component Value Date    BNP 25 01/29/2016           Assessment:   Overall continued acceptable course with no interim cardiopulmonary complaints with acceptable functional status. We will defer additional diagnostic or therapeutic intervention from a cardiac perspective at this time " but we will have him get an echocardiogram in 6 months when he returns for his appointment to assess his dilated cardiomyopathy since it has been a few years since his last echocardiogram so we can compare.  I encouraged the patient to continue taking his ramipril and Coreg as prescribed and to continue his physical activity as tolerated.  We encouraged the patient to share his next laboratory results with us.       Diagnosis Plan   1. Dilated cardiomyopathy (CMS/HCC)  Adult Transthoracic Echo Complete W/ Cont if Necessary Per Protocol   2. Chronic combined systolic and diastolic congestive heart failure (CMS/HCC)   Stable, continue Coreg, ramipril, Lasix   3. Dyslipidemia   No new labs to review, encouraged the patient to share his next laboratory results with us          Plan:         1. Patient to continue current medications and close follow up with the above providers.  2. Tentative cardiology follow up in January 2020, or patient may return sooner PRN.  3. Echocardiogram in 6 months when he returns for his appointment    Scribed for Walt Paula MD by Griselda Ramirez, APRN. 7/26/2019  12:46 PM    I, Walt Paula MD, [facility], personally performed the services described in this documentation as scribed by the above named individual in my presence, and it is both accurate and complete. At 12:47 PM on 07/26/2019

## 2020-02-07 ENCOUNTER — HOSPITAL ENCOUNTER (OUTPATIENT)
Dept: CARDIOLOGY | Facility: HOSPITAL | Age: 70
Discharge: HOME | End: 2020-02-07
Admitting: INTERNAL MEDICINE

## 2020-02-07 ENCOUNTER — OFFICE VISIT (OUTPATIENT)
Dept: CARDIOLOGY | Facility: CLINIC | Age: 70
End: 2020-02-07

## 2020-02-07 VITALS
WEIGHT: 223 LBS | BODY MASS INDEX: 28.62 KG/M2 | SYSTOLIC BLOOD PRESSURE: 109 MMHG | HEART RATE: 65 BPM | DIASTOLIC BLOOD PRESSURE: 82 MMHG | HEIGHT: 74 IN

## 2020-02-07 VITALS — BODY MASS INDEX: 28.49 KG/M2 | HEIGHT: 74 IN | WEIGHT: 222 LBS

## 2020-02-07 DIAGNOSIS — I42.0 DILATED CARDIOMYOPATHY (HCC): Primary | ICD-10-CM

## 2020-02-07 DIAGNOSIS — I50.42 CHRONIC COMBINED SYSTOLIC AND DIASTOLIC CONGESTIVE HEART FAILURE (HCC): ICD-10-CM

## 2020-02-07 DIAGNOSIS — I42.0 DILATED CARDIOMYOPATHY (HCC): ICD-10-CM

## 2020-02-07 DIAGNOSIS — G47.33 MODERATE OBSTRUCTIVE SLEEP APNEA: ICD-10-CM

## 2020-02-07 DIAGNOSIS — E78.5 DYSLIPIDEMIA: ICD-10-CM

## 2020-02-07 LAB
BH CV ECHO MEAS - AO MAX PG (FULL): 2.4 MMHG
BH CV ECHO MEAS - AO MAX PG: 4.8 MMHG
BH CV ECHO MEAS - AO MEAN PG (FULL): 1.4 MMHG
BH CV ECHO MEAS - AO MEAN PG: 2.6 MMHG
BH CV ECHO MEAS - AO V2 MAX: 109.7 CM/SEC
BH CV ECHO MEAS - AO V2 MEAN: 75.9 CM/SEC
BH CV ECHO MEAS - AO V2 VTI: 25.1 CM
BH CV ECHO MEAS - AVA(I,A): 2.2 CM^2
BH CV ECHO MEAS - AVA(I,D): 2.2 CM^2
BH CV ECHO MEAS - AVA(V,A): 2.4 CM^2
BH CV ECHO MEAS - AVA(V,D): 2.4 CM^2
BH CV ECHO MEAS - BSA(HAYCOCK): 2.3 M^2
BH CV ECHO MEAS - BSA: 2.3 M^2
BH CV ECHO MEAS - BZI_BMI: 28.5 KILOGRAMS/M^2
BH CV ECHO MEAS - BZI_METRIC_HEIGHT: 188 CM
BH CV ECHO MEAS - BZI_METRIC_WEIGHT: 100.7 KG
BH CV ECHO MEAS - EDV(CUBED): 141.3 ML
BH CV ECHO MEAS - EDV(MOD-SP2): 113 ML
BH CV ECHO MEAS - EDV(MOD-SP4): 130 ML
BH CV ECHO MEAS - EDV(TEICH): 130 ML
BH CV ECHO MEAS - EF(CUBED): 76.8 %
BH CV ECHO MEAS - EF(MOD-BP): 59 %
BH CV ECHO MEAS - EF(MOD-SP2): 61.1 %
BH CV ECHO MEAS - EF(MOD-SP4): 57.7 %
BH CV ECHO MEAS - EF(TEICH): 68.5 %
BH CV ECHO MEAS - ESV(CUBED): 32.8 ML
BH CV ECHO MEAS - ESV(MOD-SP2): 44 ML
BH CV ECHO MEAS - ESV(MOD-SP4): 55 ML
BH CV ECHO MEAS - ESV(TEICH): 41 ML
BH CV ECHO MEAS - FS: 38.5 %
BH CV ECHO MEAS - IVS/LVPW: 1
BH CV ECHO MEAS - IVSD: 0.9 CM
BH CV ECHO MEAS - LA DIMENSION: 3.9 CM
BH CV ECHO MEAS - LAD MAJOR: 4.8 CM
BH CV ECHO MEAS - LAT PEAK E' VEL: 6 CM/SEC
BH CV ECHO MEAS - LATERAL E/E' RATIO: 11.4
BH CV ECHO MEAS - LV DIASTOLIC VOL/BSA (35-75): 57.2 ML/M^2
BH CV ECHO MEAS - LV MASS(C)D: 177.5 GRAMS
BH CV ECHO MEAS - LV MASS(C)DI: 78.1 GRAMS/M^2
BH CV ECHO MEAS - LV MAX PG: 2.4 MMHG
BH CV ECHO MEAS - LV MEAN PG: 1.2 MMHG
BH CV ECHO MEAS - LV SYSTOLIC VOL/BSA (12-30): 24.2 ML/M^2
BH CV ECHO MEAS - LV V1 MAX: 77.5 CM/SEC
BH CV ECHO MEAS - LV V1 MEAN: 50.6 CM/SEC
BH CV ECHO MEAS - LV V1 VTI: 16.2 CM
BH CV ECHO MEAS - LVIDD: 5.2 CM
BH CV ECHO MEAS - LVIDS: 3.4 CM
BH CV ECHO MEAS - LVLD AP2: 8.6 CM
BH CV ECHO MEAS - LVLD AP4: 8.5 CM
BH CV ECHO MEAS - LVLS AP2: 7.4 CM
BH CV ECHO MEAS - LVLS AP4: 7.5 CM
BH CV ECHO MEAS - LVOT AREA (M): 3.5 CM^2
BH CV ECHO MEAS - LVOT AREA: 3.3 CM^2
BH CV ECHO MEAS - LVOT DIAM: 2.1 CM
BH CV ECHO MEAS - LVPWD: 0.9 CM
BH CV ECHO MEAS - MED PEAK E' VEL: 5.9 CM/SEC
BH CV ECHO MEAS - MEDIAL E/E' RATIO: 11.6
BH CV ECHO MEAS - MV A MAX VEL: 81.4 CM/SEC
BH CV ECHO MEAS - MV DEC TIME: 0.23 SEC
BH CV ECHO MEAS - MV E MAX VEL: 70.6 CM/SEC
BH CV ECHO MEAS - MV E/A: 0.87
BH CV ECHO MEAS - PA ACC SLOPE: 501.3 CM/SEC^2
BH CV ECHO MEAS - PA ACC TIME: 0.12 SEC
BH CV ECHO MEAS - PA MAX PG: 5.5 MMHG
BH CV ECHO MEAS - PA PR(ACCEL): 26.7 MMHG
BH CV ECHO MEAS - PA V2 MAX: 116.7 CM/SEC
BH CV ECHO MEAS - SI(CUBED): 47.8 ML/M^2
BH CV ECHO MEAS - SI(LVOT): 23.9 ML/M^2
BH CV ECHO MEAS - SI(MOD-SP2): 30.4 ML/M^2
BH CV ECHO MEAS - SI(MOD-SP4): 33 ML/M^2
BH CV ECHO MEAS - SI(TEICH): 39.2 ML/M^2
BH CV ECHO MEAS - SV(CUBED): 108.5 ML
BH CV ECHO MEAS - SV(LVOT): 54.2 ML
BH CV ECHO MEAS - SV(MOD-SP2): 69 ML
BH CV ECHO MEAS - SV(MOD-SP4): 75 ML
BH CV ECHO MEAS - SV(TEICH): 89 ML
BH CV ECHO MEASUREMENTS AVERAGE E/E' RATIO: 11.87
BH CV VAS BP RIGHT ARM: NORMAL MMHG
LEFT ATRIUM VOLUME INDEX: 19.8 ML/M^2
LEFT ATRIUM VOLUME: 45 ML
LV EF 2D ECHO EST: 62 %
MAXIMAL PREDICTED HEART RATE: 151 BPM
STRESS TARGET HR: 128 BPM

## 2020-02-07 PROCEDURE — 93306 TTE W/DOPPLER COMPLETE: CPT

## 2020-02-07 PROCEDURE — 93306 TTE W/DOPPLER COMPLETE: CPT | Performed by: INTERNAL MEDICINE

## 2020-02-07 PROCEDURE — 99214 OFFICE O/P EST MOD 30 MIN: CPT | Performed by: INTERNAL MEDICINE

## 2020-02-07 NOTE — PROGRESS NOTES
Subjective:     Encounter Date:02/07/2020    Patient ID: Howard Luis is a 69 y.o.  white male, retired [employer] , from [place], [place].      PHYSICIAN: Jessica Starkey MD  OTOLARYNGOLOGIST: Dr. Dela Cruz, [place], [place]  PRIOR CARDIOLOGIST: Ele Patterson MD    Chief Complaint:   Chief Complaint   Patient presents with   • Cardiomyopathy     Problem List:  1. Nonischemic dilated congestive cardiomyopathy:  a. Remote CCS class I angina pectoris/NYHA class II-III exertional dyspnea and fatigue syndrome with abnormal EKG/abnormal echocardiogram with diastolic LV dysfunction and bi-atrial enlargement, RV enlargement without valvulopathy with mild MR and trace TR with acceptable LVEF (0.50) with subsequently abnormal Cardiolite GXT demonstrating dilated left ventricle with diffuse hypokinesis and reduced LVEF (0.40) with moderate inferolateral ischemia with good exercise tolerance and capacity, autumn 2011.  b. Normal dominant right coronary arteries with reduced compensated systolic LV function (LVEF 0.42), February 2012.  c. Residual CCS class I angina pectoris/NYHA class I-II exertional dyspnea and fatigue syndrome with stable abnormal echocardiograms, November 2012; July 2013; September 2014; June 2015.   d. Remote CCS class II chest pain syndrome with NYHA class II-III exertional dyspnea and fatigue syndrome with acceptable echocardiogram and acceptable laboratory studies including normal D-dimer and BNP as well as low probability of pulmonary ventilation-perfusion scan, February 2016.  e. Remote CCS class I angina pectoris/NYHA class II exertional dyspnea and fatigue with preliminary acceptable echocardiogram and within normal limits EKG (November 2016).  f. Remote CCS class II-III chest pain syndrome with NYHA class II-III exertional dyspnea and fatigue with limited activity, May 2017, and subsequent acceptable negative Cardiolite GXT suggestive of low probability for CAD  following exercise to 100% predicted exercise capacity with inability to gait rhythm to perform LVEF assessment.  g. Preliminary echocardiogram 2/7/2020 demonstrated EF 0.59, trace to mild MR, trace TR with residual class I symptoms.  2. Dyslipidemia.   3. Mildly overweight (BMI 28.5).   4. Erectile dysfunction.   5. Intermittent recurrent sinusitis, winter 2012.   6. Remote operations:  a. Upper and lower extensive dental implants, 1992 and 2004.   b. Left forearm basal cell carcinoma removal, February 2011.   c. Complex nasal/sinus surgery - data deficit, [place], January 2012.   7. Asymptomatic cholelithiasis.   8. Remote noncompliance.  9. Fatigue syndrome with a history of normal formal sleep evaluation 9 years ago, 2006.  10. PVCs with 24-hour Holter monitor demonstrating 2.2% VEs and no evidence of atrial fibrillation or heart block August 2018  11. Moderate obstructive sleep apnea with sleep study and initiation of CPAP October 2019     No Known Allergies      Current Outpatient Medications:   •  aspirin 81 MG tablet, Take  by mouth daily., Disp: , Rfl:   •  carvedilol (COREG) 12.5 MG tablet, Take 12.5 mg by mouth 2 (Two) Times a Day With Meals., Disp: , Rfl:   •  cetirizine (zyrTEC) 10 MG tablet, Take 10 mg by mouth Daily., Disp: , Rfl:   •  Cholecalciferol (VITAMIN D3) 81698 UNITS capsule, Take 5,000 Units by mouth As Needed., Disp: , Rfl:   •  FLUoxetine (PROzac) 40 MG capsule, Take  by mouth 2 (two) times a day., Disp: , Rfl:   •  fluticasone (FLONASE) 50 MCG/ACT nasal spray, 1 spray into each nostril as needed., Disp: , Rfl:   •  furosemide (LASIX) 40 MG tablet, Take 40 mg by mouth daily., Disp: , Rfl:   •  ibuprofen (ADVIL,MOTRIN) 600 MG tablet, Take  by mouth as needed., Disp: , Rfl:   •  meloxicam (MOBIC) 15 MG tablet, Take 15 mg by mouth As Needed., Disp: , Rfl:   •  montelukast (SINGULAIR) 10 MG tablet, Take 10 mg by mouth Every Night., Disp: , Rfl:   •  nitroglycerin (NITROSTAT) 0.4 MG SL  tablet, Place 0.4 mg under the tongue As Needed for Chest Pain. Take no more than 3 doses in 15 minutes., Disp: , Rfl:   •  NON FORMULARY, Every Night. C pap, Disp: , Rfl:   •  potassium chloride (K-DUR,KLOR-CON) 20 MEQ CR tablet, Take 20 mEq by mouth Daily., Disp: , Rfl:   •  ramipril (ALTACE) 10 MG capsule, Take  by mouth daily., Disp: , Rfl:   •  Testosterone Cypionate (DEPO-TESTOSTERONE) 200 MG/ML injection, Apply  to cheek Every 28 (Twenty-Eight) Days., Disp: , Rfl:   •  zolpidem (AMBIEN) 10 MG tablet, Take  by mouth as needed., Disp: , Rfl:     HISTORY OF PRESENT ILLNESS: Patient returns for scheduled 6-1/2 month followup. The patient had an echocardiogram before coming to our office today; this will be read, and a letter will be sent to the patient with those results. Preliminary echocardiogram demonstrated EF of 0.59, trace to mild MR, and trace TR. The patient brought his laboratory results with him today for our reviewm and his creatinine was 1.53.  Since those labs were done, he has eliminated all caffeinated beverages and is only drinking water now.  He will follow with his physician for a repeat BMP.  He has some mild shortness of breath on exertion but does not feel that this is any worse than it was 6 months ago.  He denies any chest pain, palpitations, presyncope, or syncope.  He has altered his diet and has intentionally lost about 10-15 pounds in the past month.  The patient states that he feels better since he has done this.  He is also now on CPAP and had a sleep test demonstrating moderate obstructive sleep apnea.  He has less daytime sleepiness now.  Patient otherwise denies chest pain, severe shortness of breath, PND, edema, palpitations, syncope or presyncope at this time.  He and his wife are approaching their 50th wedding anniversary and are planning a celebration in summer 2020.        Review of Systems   All other systems reviewed and are negative.     All other systems reviewed and  "otherwise negative.    Procedures       Objective:       Vitals:    02/07/20 1402 02/07/20 1403   BP: 121/82 109/82   BP Location: Right arm Right arm   Patient Position: Sitting Standing   Pulse: 59 65   Weight: 101 kg (223 lb)    Height: 188 cm (74\")      Body mass index is 28.63 kg/m².   Last weight:  222 lbs.    Physical Exam   Constitutional: He is oriented to person, place, and time. He appears well-developed and well-nourished.   Neck: No JVD present. Carotid bruit is not present. No thyromegaly present.   Cardiovascular: Regular rhythm, S1 normal and S2 normal. Exam reveals no gallop, no S3 and no friction rub.   Murmur heard.   Medium-pitched harsh early systolic murmur is present with a grade of 2/6 at the lower left sternal border.  Pulses:       Dorsalis pedis pulses are 2+ on the right side, and 2+ on the left side.        Posterior tibial pulses are 2+ on the right side, and 2+ on the left side.   Pulmonary/Chest: Effort normal. He has decreased breath sounds. He has no wheezes. He has no rhonchi. He has no rales.   Abdominal: Soft. He exhibits no mass. There is no hepatosplenomegaly. There is no tenderness. There is no guarding.   Bowel sounds audible x4   Musculoskeletal: Normal range of motion. He exhibits no edema.   Lymphadenopathy:     He has no cervical adenopathy.   Neurological: He is alert and oriented to person, place, and time.   Skin: Skin is warm, dry and intact. No rash noted.   Vitals reviewed.        Lab Review:   Lab Results   Component Value Date    GLUCOSE 89 01/29/2016    BUN 17 01/29/2016    CREATININE 1.25 01/29/2016    BCR 13.5 01/29/2016    CO2 31.7 01/29/2016    CALCIUM 9.8 01/29/2016       Lab Results   Component Value Date    WBC 5.9 01/29/2016    HGB 18.0 01/29/2016    HCT 53.1 (H) 01/29/2016    MCV 90.8 01/29/2016     01/29/2016       Lab Results   Component Value Date    BNP 25 01/29/2016 1/7/2020:  · CBC: WBC 6.13, RBC 5.57, hemoglobin 17.4, hematocrit 50.3, MCV " 90.3, MCH 31.2, MCHC 34.6, RDW 12.5, platelets 173, neutrophils 63.7, lymphocytes 23, monocytes 10.8, eos 1.5, basos 0.7  · CMP: Glucose 98, BUN 20, creatinine 1.53, GFR 45, sodium 136, potassium 4.4, chloride 97, carbon oxide 25.8, calcium 9.1, protein 7.3, albumin 4, globulin 3.3, bilirubin 0.7, alkaline phosphatase 59, AST 21, ALT 13  · Lipid panel: Cholesterol 150, triglycerides 123, HDL 39, LDL 86  · PSA - 2.2  · Vitamin B12 - 1478  · Folate - 5.67  · Testosterone - >1500  · Vitamin D - 30.1        Assessment:   Overall continued acceptable course with no new interim cardiopulmonary complaints with good functional status. We will defer additional diagnostic or therapeutic intervention from a cardiac perspective at this time. Preliminary echocardiogram demonstrates EF of 0.59, trace to mild MR, and trace TR. The patient had an echocardiogram before coming to our office today; this will be read, and a letter will be sent to the patient with those results.  The patient was diagnosed with obstructive sleep apnea in the fall of 2019 and is now on CPAP; we encouraged compliance, and this was an excellent diagnostic and therapeutic intervention.  We encouraged the patient to follow-up with his physician for repeat BMP for elevated creatinine levels.       Diagnosis Plan   1. Dilated cardiomyopathy (CMS/HCC)  Preliminary echocardiogram demonstrated EF 59%, trace to mild MR, trace TR   2. Chronic combined systolic and diastolic congestive heart failure (CMS/HCC)  Stable, continue ramipril, coreg, preliminary echocardiogram demonstrated EF 59%, trace to mild MR, trace TR   3. Dyslipidemia   Acceptable lipid panel January 2020, continue heart healthy diet   4.  Moderate obstructive sleep apnea: Encouraged compliance       Plan:         1. Patient to continue current medications and close follow up with the above providers.  2. Tentative cardiology follow up in August 2020, or patient may return sooner PRN.  3. The patient  had an echocardiogram before coming to our office today; this will be read, and a letter will be sent to the patient with those results.  4. Encouraged compliance with CPAP  5. Encouraged the patient to follow-up with his physician for repeat BMP for elevated creatinine levels    Scribed for Walt Paula MD by Griselda Ramirez, KEVIN. 2/7/2020  2:18 PM    I, Walt Paula MD, [facility], personally performed the services described in this documentation as scribed by the above named individual in my presence, and it is both accurate and complete. At 2:36 PM on 02/07/2020

## 2021-02-17 DIAGNOSIS — Z23 IMMUNIZATION DUE: ICD-10-CM
